# Patient Record
(demographics unavailable — no encounter records)

---

## 2025-01-22 NOTE — PLAN
[FreeTextEntry1] : Advised at length If testosterone level is normal, will start  viagra Advised at length

## 2025-01-22 NOTE — HISTORY OF PRESENT ILLNESS
[Hyperlipidemia] : Hyperlipidemia [Hypertension] : Hypertension [FreeTextEntry6] : Pt is here for fasting blood work recheck.

## 2025-01-22 NOTE — HEALTH RISK ASSESSMENT
[0] : 2) Feeling down, depressed, or hopeless: Not at all (0) [PHQ-2 Negative - No further assessment needed] : PHQ-2 Negative - No further assessment needed [[id number]] : 0

## 2025-04-03 NOTE — HISTORY OF PRESENT ILLNESS
[FreeTextEntry1] : spots [de-identified] : Mr. PEMA MARTINES is a 61 year old M here for evaluation of below   #FBSE.  Spots scattered on body x years. Asymptomatic and unchanged. No alleviating/aggravating factors. Never been treated.    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

## 2025-04-03 NOTE — PHYSICAL EXAM
[Alert] : alert [Oriented x 3] : ~L oriented x 3 [Well Nourished] : well nourished [Conjunctiva Non-injected] : conjunctiva non-injected [No Visual Lymphadenopathy] : no visual  lymphadenopathy [No Clubbing] : no clubbing [No Edema] : no edema [No Bromhidrosis] : no bromhidrosis [No Chromhidrosis] : no chromhidrosis [Full Body Skin Exam Performed] : performed [FreeTextEntry3] : General: Alert and oriented, in NAD.  All of the following were examined and were within normal limits, except as noted:   Scalp: Face, including eyelids, nose, lips, ears, oropharynx: Neck: Chest/Back/Abdomen: Bilateral Arms/Hands: Bilateral Legs/Feet: Buttocks, Genitalia, Anus/perineum:  	 Hair, Nails, Oral Mucosa, Eyes:   lentigines on face, arms, torso stuckon waxy brown to tan papules on scalp, torso red papules

## 2025-04-03 NOTE — ASSESSMENT
[FreeTextEntry1] : #Seborrheic Keratosis - These growths are benign - Related to genetics - these lesions run in families; NOT related to sun exposure - No treatment warranted unless inflamed; can use OTC Sarna lotion PRN itch  #Angioma #Solar lentigo #Screening exam for skin cancer - no suspicious lesions on exam today - TBSE performed today - Advised sun protection. Recommended OTC sunscreen products (EltaMD/Neutrogena/La Roche Posay), including SPF30+ with broadband UV protection as well as proper use. Discussed OTC sun protective clothing - Counseled patient to monitor for changes, including mole monitoring and self-skin exams